# Patient Record
Sex: FEMALE | Race: WHITE | NOT HISPANIC OR LATINO | Employment: OTHER | URBAN - NONMETROPOLITAN AREA
[De-identification: names, ages, dates, MRNs, and addresses within clinical notes are randomized per-mention and may not be internally consistent; named-entity substitution may affect disease eponyms.]

---

## 2017-08-15 RX ORDER — CETIRIZINE HYDROCHLORIDE 10 MG/1
10 TABLET ORAL DAILY
COMMUNITY

## 2017-08-15 RX ORDER — TOPIRAMATE 25 MG/1
25 CAPSULE, COATED PELLETS ORAL 2 TIMES DAILY
COMMUNITY

## 2017-08-15 RX ORDER — BISOPROLOL FUMARATE 5 MG/1
5 TABLET, FILM COATED ORAL DAILY
COMMUNITY

## 2017-08-15 RX ORDER — QUETIAPINE FUMARATE 25 MG/1
25 TABLET, FILM COATED ORAL NIGHTLY
COMMUNITY
End: 2017-08-18

## 2017-08-15 RX ORDER — FLUTICASONE PROPIONATE 50 MCG
2 SPRAY, SUSPENSION (ML) NASAL DAILY
COMMUNITY

## 2017-08-15 RX ORDER — CLONAZEPAM 0.5 MG/1
0.5 TABLET ORAL NIGHTLY
COMMUNITY
End: 2018-05-25

## 2017-08-15 RX ORDER — FLUOXETINE HYDROCHLORIDE 20 MG/1
20 CAPSULE ORAL DAILY
COMMUNITY
End: 2017-08-18 | Stop reason: ALTCHOICE

## 2017-08-15 RX ORDER — ESTRADIOL 0.5 MG/1
1 TABLET ORAL DAILY
COMMUNITY

## 2017-08-15 RX ORDER — NAPROXEN SODIUM 220 MG
220 TABLET ORAL 2 TIMES DAILY PRN
COMMUNITY

## 2017-08-18 ENCOUNTER — OFFICE VISIT (OUTPATIENT)
Dept: NEUROLOGY | Facility: CLINIC | Age: 52
End: 2017-08-18

## 2017-08-18 VITALS
HEIGHT: 65 IN | DIASTOLIC BLOOD PRESSURE: 70 MMHG | BODY MASS INDEX: 26.49 KG/M2 | HEART RATE: 62 BPM | WEIGHT: 159 LBS | RESPIRATION RATE: 18 BRPM | SYSTOLIC BLOOD PRESSURE: 110 MMHG

## 2017-08-18 DIAGNOSIS — R26.89 IMBALANCE: ICD-10-CM

## 2017-08-18 DIAGNOSIS — M54.2 NECK PAIN: ICD-10-CM

## 2017-08-18 DIAGNOSIS — G47.33 OSA (OBSTRUCTIVE SLEEP APNEA): ICD-10-CM

## 2017-08-18 DIAGNOSIS — R40.4 EPISODE OF ALTERED CONSCIOUSNESS: Primary | ICD-10-CM

## 2017-08-18 DIAGNOSIS — G43.119 INTRACTABLE MIGRAINE WITH AURA WITHOUT STATUS MIGRAINOSUS: ICD-10-CM

## 2017-08-18 PROCEDURE — 99204 OFFICE O/P NEW MOD 45 MIN: CPT | Performed by: PSYCHIATRY & NEUROLOGY

## 2017-08-18 NOTE — PATIENT INSTRUCTIONS
No driving until released.  No work until released.  No climbing/ no use of sharp cutting tools.  Take showers not baths.  Not to swim by self or getting in hot tub by self.  No work over hot fires or water.  Patient is to go to emergency room if episode of altered consciousness occurs for evaluation

## 2017-08-18 NOTE — PROGRESS NOTES
Subjective   Rand Guzman, 1965, is a female who is being seen today for   Chief Complaint   Patient presents with   • Headache   • Loss of Consciousness       HISTORY OF PRESENT ILLNESS: Patient seen for episode of altered consciousness.  Patient has had episodes of altered consciousness for 17-18 years.  These were usually in stressful situations.  However apparently these become more frequent.  Her headaches are more frequent.  Usually the headaches are over the vertex area 2-3 times per week.  Usually preceded by neck discomfort going up to the vertex.  Usually associated with blurred vision in both eyes and nausea and sometimes vomiting.  Patient  frequently will pass out with these episodes and had an episode last night where her  came home and found her in bed unconscious.  Sometimes she will be out 10 minutes as long as 1 hour.  She had another one in [employer] last week.  Sometimes she will have several passing out episodes in a row.  Patient does not have any tonic-clonic activity term biting or incontinence.  Patient sometimes can hear but not respond during these episodes.  Her  thought with the episode last night her eyes were puffy and she was possibly cross eyed.  Patient felt a pop in her neck July 15 has had neck pain since that time.  Sometimes his pain goes into the left arm and left leg with numbness.  Patient cleans houses for a living.  Patient is had workup last November brain MRI of with him about that showed no significant abnormalities.  Also CT angiography of the brain showed no significant abnormalities last year.  She has not had an EEG.  Patient previous workup showed some sinus bradycardia but otherwise no major abnormalities/ patient is on Topamax 25 mg by mouth twice a day with the last several months which does seem to help the headache.  Patient was put on Seroquel but stopped that.  Patient has positive sleep study for JAQUAN but could not tolerate the CPAP.  Patient  has not had any other head trauma.  Patient thinks that her family doctor checked her blood sugar on her with one of these spells but not sure    REVIEW OF SYSTEMS:   GENERAL: Patient sometimes feels cold coming out of the episodes.  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  I do not have any of those records for review  ENDOCRINE:  No acute endocrine symptoms  PSYCHIATRIC: As above  HEMATOLOGY: No recent blood work for review  SKIN: No skin changes  Family history negative for seizures and migraine.  There has been history of stroke in family member  Social history: Patient denies smoking or drug or alcohol use    PHYSICAL EXAMINATION:    GENERAL: No acute distress.  No specific tenderness over the cranium or neck  CRANIUM: Normocephalic/atraumatic  HEENT: No acute fundic abnormalities.  Pupils equal round reactive to light.       EYES: Fields full to confrontation and EOMs intact without nystagmus       EARS:  Tympanic membranes normal hears tuning fork bilaterally       THROAT: No oropharynx abnormalities       NECK:  No bruits/no lymphadenopathy  CHEST: No acute cardiopulmonary abnormalities by auscultation  ABDOMEN: Nondistended  EXTREMITIES: Pulses symmetrical  NEURO: Patient alert and follows commands without difficulty  SPEECH:  Normal    CRANIAL NERVES:  Motor sensory about the face normal and symmetric    MOTOR STRENGTH:  Motor strength upper and lower extremities normal and symmetric  STATION AND GAIT:  Gait is normal/Romberg negative  CEREBELLAR:  Finger-nose and heel shin normal  SENSORY:  Pin and vibration normal upper and lower extremities  REFLEXES:  Reflexes present but decreased throughout upper and lower extremities without  Babinski's or clonus      ASSESSMENT AND PLAN:  Patient with episode of altered consciousness with history also of headaches/possible migraines.  Patient is to get MRI/MRA brain and noninvasive carotids and 2-D echo with bubble study.  Patient also has the chronic neck pain get MRI cervical.  Patient is to get an EEG and 24-hour EEG to follow.  I tried to get a stat EEG done this afternoon but EEG was not available at [facility] today.  Patient is to follow seizure precautions and no driving.  If further episode occurs is to go to emergency room immediately for evaluation.  Glucose tolerance test and further blood work to be done.      Rand was seen today for headache and loss of consciousness.    Diagnoses and all orders for this visit:    Episode of altered consciousness  -     CBC & Differential; Future  -     Comprehensive Metabolic Panel; Future  -     EEG; Future  -     Lipid Panel; Future  -     Magnesium; Future  -     Sedimentation Rate; Future  -     T4, Free; Future  -     Vitamin B12; Future  -     Folate; Future  -     MRI Angiogram Head Without Contrast; Future  -     Ambulatory EEG (Hospital Performed); Future  -     Adult Transthoracic Echo Complete; Future  -     MRI Brain Without Contrast; Future  -     Glucose Tolerance, 3 Hours; Future  -     Topiramate Level; Future    Intractable migraine with aura without status migrainosus  -     Topiramate Level; Future    Neck pain    JAQUAN (obstructive sleep apnea)  -     MRI Cervical Spine Without Contrast; Future    Imbalance  -     Overnight Sleep Oximetry Study; Future  -     US Carotid Bilateral; Future

## 2017-08-22 ENCOUNTER — TELEPHONE (OUTPATIENT)
Dept: NEUROLOGY | Facility: CLINIC | Age: 52
End: 2017-08-22

## 2017-08-22 NOTE — TELEPHONE ENCOUNTER
Mrs. Guzman called the office regarding the EEG's scheduled on Thursday 08/24/2017. She said that this date would not work for her (she will be out-of-town) and asked that we get them rescheduled. The next opportunity the EEG lab has to work her in is on, Thursday 08/31/2017. She took this appointment.

## 2017-08-24 ENCOUNTER — APPOINTMENT (OUTPATIENT)
Dept: NEUROLOGY | Facility: HOSPITAL | Age: 52
End: 2017-08-24
Attending: PSYCHIATRY & NEUROLOGY

## 2017-08-24 ENCOUNTER — HOSPITAL ENCOUNTER (OUTPATIENT)
Dept: NEUROLOGY | Facility: HOSPITAL | Age: 52
End: 2017-08-24
Attending: PSYCHIATRY & NEUROLOGY

## 2017-08-25 ENCOUNTER — HOSPITAL ENCOUNTER (OUTPATIENT)
Dept: NEUROLOGY | Facility: HOSPITAL | Age: 52
End: 2017-08-25

## 2017-08-25 ENCOUNTER — APPOINTMENT (OUTPATIENT)
Dept: NEUROLOGY | Facility: HOSPITAL | Age: 52
End: 2017-08-25

## 2017-08-25 DIAGNOSIS — R26.89 IMBALANCE: ICD-10-CM

## 2017-08-31 ENCOUNTER — HOSPITAL ENCOUNTER (OUTPATIENT)
Dept: NEUROLOGY | Facility: HOSPITAL | Age: 52
Discharge: HOME | End: 2017-08-31
Attending: PSYCHIATRY & NEUROLOGY

## 2017-08-31 ENCOUNTER — APPOINTMENT (OUTPATIENT)
Dept: NEUROLOGY | Facility: HOSPITAL | Age: 52
End: 2017-08-31
Attending: PSYCHIATRY & NEUROLOGY

## 2017-08-31 ENCOUNTER — HOSPITAL ENCOUNTER (OUTPATIENT)
Dept: NEUROLOGY | Facility: HOSPITAL | Age: 52
Discharge: HOME | End: 2017-08-31
Attending: PSYCHIATRY & NEUROLOGY | Admitting: PSYCHIATRY & NEUROLOGY

## 2017-08-31 DIAGNOSIS — R40.4 EPISODE OF ALTERED CONSCIOUSNESS: ICD-10-CM

## 2017-08-31 PROCEDURE — 95816 EEG AWAKE AND DROWSY: CPT | Performed by: PSYCHIATRY & NEUROLOGY

## 2017-08-31 PROCEDURE — 95816 EEG AWAKE AND DROWSY: CPT

## 2017-08-31 PROCEDURE — 95953 AMBULATORY EEG: CPT | Performed by: PSYCHIATRY & NEUROLOGY

## 2017-09-01 ENCOUNTER — TELEPHONE (OUTPATIENT)
Dept: NEUROLOGY | Facility: CLINIC | Age: 52
End: 2017-09-01

## 2017-09-01 ENCOUNTER — HOSPITAL ENCOUNTER (OUTPATIENT)
Dept: NEUROLOGY | Facility: HOSPITAL | Age: 52
End: 2017-09-01

## 2017-09-01 ENCOUNTER — HOSPITAL ENCOUNTER (OUTPATIENT)
Dept: NEUROLOGY | Facility: HOSPITAL | Age: 52
Discharge: HOME | End: 2017-09-01
Attending: PSYCHIATRY & NEUROLOGY | Admitting: PSYCHIATRY & NEUROLOGY

## 2017-09-01 PROCEDURE — 95950 HC EEG W/O VIDEO RECORDING EACH 24 HRS: CPT

## 2017-09-01 NOTE — TELEPHONE ENCOUNTER
I was able to speak with Mrs Guzman this morning. At this time she is not interested in the sleep study suggested by Dr Nails. She states she has tried to complete one in the past but did not like it. She states she will think on it and call the office back if she decides to do the sleep study.

## 2017-09-11 ENCOUNTER — TELEPHONE (OUTPATIENT)
Dept: NEUROLOGY | Facility: CLINIC | Age: 52
End: 2017-09-11

## 2017-09-11 NOTE — TELEPHONE ENCOUNTER
Rand called to let Krupa know that she did get her letter. She would like to hold off on the referral to Dr Chapman until she can speak with her . She states she will call Krupa back tomorrow to let her know about the referral to Dr Chapman and the Sleep Study.

## 2017-09-13 DIAGNOSIS — G43.119 INTRACTABLE MIGRAINE WITH AURA WITHOUT STATUS MIGRAINOSUS: ICD-10-CM

## 2017-09-13 DIAGNOSIS — R40.4 EPISODE OF ALTERED CONSCIOUSNESS: ICD-10-CM

## 2017-09-20 ENCOUNTER — TELEPHONE (OUTPATIENT)
Dept: NEUROLOGY | Facility: CLINIC | Age: 52
End: 2017-09-20

## 2017-09-22 DIAGNOSIS — R26.89 IMBALANCE: ICD-10-CM

## 2017-09-22 DIAGNOSIS — G47.33 OSA (OBSTRUCTIVE SLEEP APNEA): ICD-10-CM

## 2017-09-22 DIAGNOSIS — R40.4 EPISODE OF ALTERED CONSCIOUSNESS: ICD-10-CM

## 2017-09-29 ENCOUNTER — OFFICE VISIT (OUTPATIENT)
Dept: NEUROLOGY | Facility: CLINIC | Age: 52
End: 2017-09-29

## 2017-09-29 ENCOUNTER — TELEPHONE (OUTPATIENT)
Dept: NEUROLOGY | Facility: CLINIC | Age: 52
End: 2017-09-29

## 2017-09-29 ENCOUNTER — HOSPITAL ENCOUNTER (OUTPATIENT)
Dept: CT IMAGING | Facility: HOSPITAL | Age: 52
Discharge: HOME | End: 2017-09-29
Attending: PSYCHIATRY & NEUROLOGY | Admitting: PSYCHIATRY & NEUROLOGY

## 2017-09-29 ENCOUNTER — DOCUMENTATION (OUTPATIENT)
Dept: NEUROLOGY | Facility: CLINIC | Age: 52
End: 2017-09-29

## 2017-09-29 ENCOUNTER — HOSPITAL ENCOUNTER (OUTPATIENT)
Dept: CT IMAGING | Facility: HOSPITAL | Age: 52
Discharge: HOME | End: 2017-09-29
Attending: PSYCHIATRY & NEUROLOGY

## 2017-09-29 VITALS
DIASTOLIC BLOOD PRESSURE: 72 MMHG | WEIGHT: 159 LBS | SYSTOLIC BLOOD PRESSURE: 118 MMHG | RESPIRATION RATE: 18 BRPM | BODY MASS INDEX: 26.49 KG/M2 | HEART RATE: 80 BPM | HEIGHT: 65 IN

## 2017-09-29 DIAGNOSIS — R40.4 EPISODE OF ALTERED CONSCIOUSNESS: Primary | ICD-10-CM

## 2017-09-29 DIAGNOSIS — I65.21 STENOSIS OF RIGHT CAROTID ARTERY: ICD-10-CM

## 2017-09-29 LAB — CREAT BLDA-MCNC: 1.1 MG/DL (ref 0.6–1.3)

## 2017-09-29 PROCEDURE — 82565 ASSAY OF CREATININE: CPT

## 2017-09-29 PROCEDURE — 70498 CT ANGIOGRAPHY NECK: CPT

## 2017-09-29 PROCEDURE — 70496 CT ANGIOGRAPHY HEAD: CPT

## 2017-09-29 PROCEDURE — 0 IOPAMIDOL PER 1 ML: Performed by: PSYCHIATRY & NEUROLOGY

## 2017-09-29 PROCEDURE — 99215 OFFICE O/P EST HI 40 MIN: CPT | Performed by: PSYCHIATRY & NEUROLOGY

## 2017-09-29 RX ADMIN — IOPAMIDOL 150 ML: 755 INJECTION, SOLUTION INTRAVENOUS at 17:30

## 2017-09-29 NOTE — TELEPHONE ENCOUNTER
I contacted patient's  regarding the results of the CT angiogram head and neck.  There was some mild subclavian stenosis 25% on the left but no significant carotid stenosis.  Also intracranially the vessels are somewhat small in the basilar distribution but this is probably congenital.  I discussed this at length with the radiologist and then with the patient.  Patient had an episode after the CT angiogram of some altered consciousness for a few minutes.  Apparently she was stable at the time I discussed this with the patient's  and he was at [facility] with his wife.  Patient is to be discussed with patient's PCP next week and patient has not had a tilt table test.  Patient might benefit from further cardiology workup.

## 2017-09-29 NOTE — PROGRESS NOTES
Subjective   Rand Guzman, 1965, is a female who is being seen today for   Chief Complaint   Patient presents with   • Headache   • Loss of Consciousness       HISTORY OF PRESENT ILLNESS: Very extended follow-up.  Patient had workup including MRI brain which showed no significant acute abnormalities.  MRA brain showed development absent posterior communicating arteries.  Patient had EEG and 24-hour EEG which showed no abnormalities with symptoms..  Echocardiogram showed no significant abnormalities bubble study negative.  C-spine MRI shows cervical straightening which may be positional per second and muscle spasm and uncovertebral joint hypertrophy at C4/C5 and C5/C6 is bilateral neural foraminal narrowing.  Glucose tolerance test no abnormalities and rest of blood work basically noncontributory except for some elevation of triglycerides and cholesterol which were moderate.  Free T4 was borderline low.  Overnight continuous oximetry does not qualify for oxygen and patient has not been able tolerate Pap device and past.  Patient did have 18 events per hour.  Patient's noninvasive carotids question of possibility of 60-80% stenosis in the right internal carotid but there was some discrepancy between elevated ratio on the right without corresponding elevated velocity.  CTA or MRA neck was recommended.  Patient is had previous CTA head last year / [facility] that was showing no abnormalities.  Patient according to the  shows me an episode this past Sunday where her episode of unresponsiveness a Muslim causes her eyes to be puffy and she was unresponsive for several minutes.  These episodes increase with exertion.  Patient has intermittent episodes of disorientation and confusion and apparently has had many as 11 episodes since last seen.  Patient continues the headaches as previously discussed essentially unchanged.  Patient says that the bottom of her feet have been somewhat numb  recently    REVIEW OF SYSTEMS:   GENERAL: No acute fever/chills  PULMONARY: As above  CVS:  As above  GASTROINTESTINAL: As previously noted    GENITOURINARY: No acute  distress  GYN: Post hysterectomy  MUSCULOSKELETAL: As above  HEENT:  Patient with chronically blurred vision  ENDOCRINE:  No acute endocrine symptoms  PSYCHIATRIC: No acute psychiatric symptoms  HEMATOLOGY: No anemia  SKIN: No acute skin changes other than as noted above  Family history of stroke  Social history: Patient with no use of smoking or drug or alcohol use    PHYSICAL EXAMINATION:    GENERAL: No acute distress  CRANIUM: Normocephalic/atraumatic  HEENT: No acute fundic abnormalities.  Pupils equal round reactive to light.       EYES: EOMs intact without nystagmus and fields full to confrontation       EARS:  Tympanic membranes normal hears tuning fork bilaterally       THROAT: No oropharynx abnormalities       NECK:  No bruits/no lymphadenopathy  CHEST: No acute cardiopulmonary abnormalities by auscultation  ABDOMEN: Nondistended  EXTREMITIES: Pulses symmetrical  NEURO: Patient alert and follows commands without difficulty  SPEECH:  Normal    CRANIAL NERVES:  Motor sensory about the face normal and symmetric    MOTOR STRENGTH:  Motor strength upper and lower extremity is normal  STATION AND GAIT:  Gait is normal/Romberg negative  CEREBELLAR:  Finger-nose and heel shin normal  SENSORY:  Pin and vibration normal upper and lower extremities except for slight decrease in vibration in the toes bilaterally.  REFLEXES:  Reflexes present and symmetric upper and lower extremities without Babinski's      ASSESSMENT AND PLAN:  Patient with episode of altered consciousness and possible carotid stenosis on the right.  Patient with the continuing episodes need CT angiography head and neck as possible.  If CT angiography neck and head  noncontributory patient may have some type of migraine variant but there could be several other contributing factors.  I  went over risks benefits of proceeding with CT angiography today patient and  nondistended and wishes to proceed.  Patient had had previous CT angiography of the head without significant side effects.  I spent 40 minutes with this patient with 30 minutes in counseling      Rand was seen today for headache and loss of consciousness.    Diagnoses and all orders for this visit:    Episode of altered consciousness    Stenosis of right carotid artery  -     Ambulatory Referral to Vascular Surgery  -     CT Angiogram Neck With & Without Contrast; Future  -     CT Angiogram Head With & Without Contrast; Future

## 2017-10-03 DIAGNOSIS — G47.33 OSA (OBSTRUCTIVE SLEEP APNEA): Primary | ICD-10-CM

## 2017-10-03 DIAGNOSIS — R40.4 EPISODE OF ALTERED CONSCIOUSNESS: ICD-10-CM

## 2017-10-04 ENCOUNTER — TELEPHONE (OUTPATIENT)
Dept: NEUROLOGY | Facility: CLINIC | Age: 52
End: 2017-10-04

## 2017-10-04 NOTE — TELEPHONE ENCOUNTER
PT TOLD SCHEDULING SHE WANTED TO GET HER SLEEP TESTING DONE BEFORE THE END OF THE YEAR SHE ASKED FOR THE ORDERS TO BE MAILED TO HER AND SHE TAKE THEM TO ONE OF THE HOSPITALS NEAR HER. SHE ALSO WANTED TO KNOW IF YOU ARE GOING TO INCREASE OR CHANGE ANY OF HER MEDICATION SHE USES [facility] PHONE NUMBER [phone]. SHE STATES SHE STILL HAS A HEADACHE AND HAD ANOTHER SPELL Saturday WHERE SHE FELL OUT IN THE YARD AND IT WAS A WHILE BEFORE ANYONE FOUND HER THAT DAY.

## 2017-10-05 ENCOUNTER — TELEPHONE (OUTPATIENT)
Dept: NEUROLOGY | Facility: CLINIC | Age: 52
End: 2017-10-05

## 2017-10-05 NOTE — TELEPHONE ENCOUNTER
Rand called to state that her sleep study will not be for several months. She would like to know if she may go somewhere else. I did let her know that Dr Nails prefers his studies to be done here so he can read them but I will call the sleep lab.    I did call the sleep lab and spoke with Tamia. She did check her schedule and we were able to move Rand's Polysom up to

## 2017-10-06 NOTE — TELEPHONE ENCOUNTER
I did call to let Mrs Guzman know that I am still working on a sooner appointment for her but that I do not have one for her yet. She did voice understanding.

## 2017-10-09 ENCOUNTER — TELEPHONE (OUTPATIENT)
Dept: NEUROLOGY | Facility: CLINIC | Age: 52
End: 2017-10-09

## 2017-10-09 NOTE — TELEPHONE ENCOUNTER
Rand called to request we fax a copy of the order for her sleep study to [facility] in [place].     [phone]

## 2017-11-28 ENCOUNTER — OFFICE VISIT (OUTPATIENT)
Dept: NEUROLOGY | Facility: CLINIC | Age: 52
End: 2017-11-28

## 2017-11-28 VITALS
BODY MASS INDEX: 27.16 KG/M2 | HEIGHT: 65 IN | DIASTOLIC BLOOD PRESSURE: 80 MMHG | RESPIRATION RATE: 18 BRPM | WEIGHT: 163 LBS | SYSTOLIC BLOOD PRESSURE: 114 MMHG | HEART RATE: 72 BPM

## 2017-11-28 DIAGNOSIS — G47.33 OSA (OBSTRUCTIVE SLEEP APNEA): Primary | ICD-10-CM

## 2017-11-28 PROCEDURE — 99214 OFFICE O/P EST MOD 30 MIN: CPT | Performed by: PSYCHIATRY & NEUROLOGY

## 2017-11-28 NOTE — PATIENT INSTRUCTIONS
Patient to have no driving and safety precautions as previously discussed.  No work over hot fires or water no use of sharp cutting tools no climbing.  Patient takes showers not baths and not to get a hot tub or swim by self.

## 2017-11-28 NOTE — PROGRESS NOTES
Subjective   Rand Guzman, 1965, is a female who is being seen today for   Chief Complaint   Patient presents with   • Altered Mental Status       HISTORY OF PRESENT ILLNESS: Extended follow-up.  Patient had her polysomnogram which show AHI of 20 and PLM's.  She spent 50 minutes below 90% oxygen saturation with the lowest recorded at 85%.  Patient had fragmented sleep.  CPAP titration was recommended.  Patient is had multiple events of possible sleep attacks its last seen.  Patient had an event today after coming and when I came in the room she was asleep for perhaps a minute before I cannot arouse her.  Patient had no change in vital signs.  Patient's MSLT was on hold until the titration.  Patient does not have true cataplectic symptoms.  Patient is not having hypnagogic hallucinations.  Patient may have sleep paralysis.  Patient says that when she gets under stress that things just close in.  Patient after I arouse the patient today had no postictal findings.  Patient still complaining of the headaches.    REVIEW OF SYSTEMS:   GENERAL: As above  PULMONARY: As above  CVS:  No chest pain or palpitation  GASTROINTESTINAL: Patient still has some nausea at times  GENITOURINARY: No acute  distress  GYN: No acute GYN distress  MUSCULOSKELETAL: No acute musculoskeletal symptoms  HEENT:  No acute vision or hearing change  ENDOCRINE:  No acute endocrine symptoms  PSYCHIATRIC: No acute psychiatric symptoms other than as noted above  HEMATOLOGY: No anemia  SKIN: No skin changes  Family history reviewed and otherwise noncontributory.  There is no family history of narcolepsy  Social history: Patient denies smoking or drug or alcohol use    PHYSICAL EXAMINATION:    GENERAL: As above  CRANIUM: Normocephalic and atraumatic and no specific tenderness over the cranium  HEENT: No acute fundic abnormalities.  Pupils equal round reactive to light.       EYES: EOMs intact without nystagmus and fields full to confrontation        EARS:  Tympanic membranes normal hears tuning fork bilaterally       THROAT: No oropharynx abnormalities       NECK:  No bruits/no lymphadenopathy  CHEST: No acute cardiopulmonary abnormalities by auscultation  ABDOMEN: Nondistended  EXTREMITIES: Pulses symmetrical  NEURO: Patient alert and follows commands without difficulty  SPEECH:  Normal    CRANIAL NERVES:  Motor sensory about the face normal and symmetric    MOTOR STRENGTH:  Motor strength upper and lower extremities normal  STATION AND GAIT:  Gait normal/Romberg negative  CEREBELLAR:  Finger-nose and heel shin normal  SENSORY:  Normal pin and vibration upper and lower extremities except for slight decrease in  vibration in the toes  REFLEXES:  Reflexes present and symmetric upper and lower extremity without Babinski's  OTHER:  Blood pressure today 110/80 sitting 114/80 standing left arm.  Pulse is 72 and regular    ASSESSMENT AND PLAN:  Patient with JAQUAN.  Patient is to get titration.  Patient may need MSLT at some point.  I spent 25 minutes with this patient with 15 minutes counseling.  Safety precautions are to patient not to be driving continue as previously discussed.      Rand was seen today for altered mental status.    Diagnoses and all orders for this visit:    JAQUAN (obstructive sleep apnea)  -     Polysomnography 4 or More Parameters With CPAP; Future

## 2017-12-07 ENCOUNTER — TELEPHONE (OUTPATIENT)
Dept: NEUROLOGY | Facility: CLINIC | Age: 52
End: 2017-12-07

## 2017-12-07 NOTE — TELEPHONE ENCOUNTER
Patient called to see if I would mail her a copy of her sleep study. I did tell her that I will put that in the mail today. She did voice understanding.

## 2017-12-21 ENCOUNTER — TELEPHONE (OUTPATIENT)
Dept: NEUROLOGY | Facility: CLINIC | Age: 52
End: 2017-12-21

## 2017-12-21 NOTE — TELEPHONE ENCOUNTER
Pt  has called to make Dr. Nails aware that they feel as if her sleep conditions are worsening. Next follow up is 1/29/18

## 2017-12-29 ENCOUNTER — TELEPHONE (OUTPATIENT)
Dept: NEUROLOGY | Facility: CLINIC | Age: 52
End: 2017-12-29

## 2018-01-03 ENCOUNTER — TELEPHONE (OUTPATIENT)
Dept: NEUROLOGY | Facility: CLINIC | Age: 53
End: 2018-01-03

## 2018-01-03 NOTE — TELEPHONE ENCOUNTER
----- Message from Erwin Nails MD sent at 12/29/2017  4:20 PM CST -----  Check with patient's sleep location and see if they will will do  MSLT the day after the titration  ----- Message -----     From: Russell Pelaez LPN     Sent: 12/29/2017  11:03 AM       To: Erwin Nails MD    I did send you a phone note. She is scheduled in [place] for her Titration Study 1/25/18 and will see you 1/29/18.    ----- Message -----     From: Erwin Nails MD     Sent: 12/29/2017  10:55 AM       To: Russell Pelaez LPN    Contact patient.  I had note that patient's sleep problems were worsening and I need to know where they are as far as getting the titration done.

## 2018-01-03 NOTE — TELEPHONE ENCOUNTER
----- Message from Erwin Nails MD sent at 12/29/2017 11:05 AM CST -----  See of we can contact the sleep center in [place] where she is going to have her tests and go ahead and do the titration and the MSLT on the same evening and day after because of her symptoms.  See if they can work  in earlier

## 2018-01-29 ENCOUNTER — LAB (OUTPATIENT)
Dept: LAB | Facility: HOSPITAL | Age: 53
End: 2018-01-29
Attending: PSYCHIATRY & NEUROLOGY

## 2018-01-29 ENCOUNTER — OFFICE VISIT (OUTPATIENT)
Dept: NEUROLOGY | Facility: CLINIC | Age: 53
End: 2018-01-29

## 2018-01-29 DIAGNOSIS — R40.4 EPISODE OF ALTERED CONSCIOUSNESS: Primary | ICD-10-CM

## 2018-01-29 DIAGNOSIS — G47.33 OSA (OBSTRUCTIVE SLEEP APNEA): ICD-10-CM

## 2018-01-29 DIAGNOSIS — R40.4 EPISODE OF ALTERED CONSCIOUSNESS: ICD-10-CM

## 2018-01-29 LAB
ALBUMIN SERPL-MCNC: 4.2 G/DL (ref 3.5–5)
ALBUMIN/GLOB SERPL: 1.6 G/DL (ref 1.1–2.5)
ALP SERPL-CCNC: 55 U/L (ref 24–120)
ALT SERPL W P-5'-P-CCNC: 33 U/L (ref 0–54)
ANION GAP SERPL CALCULATED.3IONS-SCNC: 11 MMOL/L (ref 4–13)
AST SERPL-CCNC: 22 U/L (ref 7–45)
BASOPHILS # BLD AUTO: 0.04 10*3/MM3 (ref 0–0.2)
BASOPHILS NFR BLD AUTO: 0.6 % (ref 0–2)
BILIRUB SERPL-MCNC: 0.3 MG/DL (ref 0.1–1)
BUN BLD-MCNC: 13 MG/DL (ref 5–21)
BUN/CREAT SERPL: 15.5 (ref 7–25)
CALCIUM SPEC-SCNC: 8.8 MG/DL (ref 8.4–10.4)
CHLORIDE SERPL-SCNC: 108 MMOL/L (ref 98–110)
CK SERPL-CCNC: 97 U/L (ref 0–203)
CO2 SERPL-SCNC: 25 MMOL/L (ref 24–31)
CREAT BLD-MCNC: 0.84 MG/DL (ref 0.5–1.4)
D-LACTATE SERPL-SCNC: 1.8 MMOL/L (ref 0.5–2)
DEPRECATED RDW RBC AUTO: 42.8 FL (ref 40–54)
EOSINOPHIL # BLD AUTO: 0.12 10*3/MM3 (ref 0–0.7)
EOSINOPHIL NFR BLD AUTO: 1.7 % (ref 0–4)
ERYTHROCYTE [DISTWIDTH] IN BLOOD BY AUTOMATED COUNT: 12.6 % (ref 12–15)
GFR SERPL CREATININE-BSD FRML MDRD: 71 ML/MIN/1.73
GLOBULIN UR ELPH-MCNC: 2.7 GM/DL
GLUCOSE BLD-MCNC: 103 MG/DL (ref 70–100)
HCT VFR BLD AUTO: 39.7 % (ref 37–47)
HGB BLD-MCNC: 13.3 G/DL (ref 12–16)
IMM GRANULOCYTES # BLD: 0.05 10*3/MM3 (ref 0–0.03)
IMM GRANULOCYTES NFR BLD: 0.7 % (ref 0–5)
LYMPHOCYTES # BLD AUTO: 2.23 10*3/MM3 (ref 0.72–4.86)
LYMPHOCYTES NFR BLD AUTO: 30.8 % (ref 15–45)
MAGNESIUM SERPL-MCNC: 2.1 MG/DL (ref 1.4–2.2)
MCH RBC QN AUTO: 31.1 PG (ref 28–32)
MCHC RBC AUTO-ENTMCNC: 33.5 G/DL (ref 33–36)
MCV RBC AUTO: 92.8 FL (ref 82–98)
MONOCYTES # BLD AUTO: 0.47 10*3/MM3 (ref 0.19–1.3)
MONOCYTES NFR BLD AUTO: 6.5 % (ref 4–12)
NEUTROPHILS # BLD AUTO: 4.32 10*3/MM3 (ref 1.87–8.4)
NEUTROPHILS NFR BLD AUTO: 59.7 % (ref 39–78)
NRBC BLD MANUAL-RTO: 0 /100 WBC (ref 0–0)
PLATELET # BLD AUTO: 320 10*3/MM3 (ref 130–400)
PMV BLD AUTO: 9.9 FL (ref 6–12)
POTASSIUM BLD-SCNC: 4.6 MMOL/L (ref 3.5–5.3)
PROT SERPL-MCNC: 6.9 G/DL (ref 6.3–8.7)
RBC # BLD AUTO: 4.28 10*6/MM3 (ref 4.2–5.4)
SODIUM BLD-SCNC: 144 MMOL/L (ref 135–145)
WBC NRBC COR # BLD: 7.23 10*3/MM3 (ref 4.8–10.8)

## 2018-01-29 PROCEDURE — 80053 COMPREHEN METABOLIC PANEL: CPT | Performed by: PSYCHIATRY & NEUROLOGY

## 2018-01-29 PROCEDURE — 99214 OFFICE O/P EST MOD 30 MIN: CPT | Performed by: PSYCHIATRY & NEUROLOGY

## 2018-01-29 PROCEDURE — 84146 ASSAY OF PROLACTIN: CPT | Performed by: PSYCHIATRY & NEUROLOGY

## 2018-01-29 PROCEDURE — 85025 COMPLETE CBC W/AUTO DIFF WBC: CPT | Performed by: PSYCHIATRY & NEUROLOGY

## 2018-01-29 PROCEDURE — 83605 ASSAY OF LACTIC ACID: CPT | Performed by: PSYCHIATRY & NEUROLOGY

## 2018-01-29 PROCEDURE — 36415 COLL VENOUS BLD VENIPUNCTURE: CPT | Performed by: PSYCHIATRY & NEUROLOGY

## 2018-01-29 PROCEDURE — 83735 ASSAY OF MAGNESIUM: CPT | Performed by: PSYCHIATRY & NEUROLOGY

## 2018-01-29 PROCEDURE — 82550 ASSAY OF CK (CPK): CPT | Performed by: PSYCHIATRY & NEUROLOGY

## 2018-01-29 NOTE — PATIENT INSTRUCTIONS
No driving.  No work at heights or with sharp cutting tools or over hot fires or water.  No climbing.  Take showers not baths.  No hot tub by self or swimming by self

## 2018-01-29 NOTE — PROGRESS NOTES
"Subjective   Rand Guzman, 1965, is a female who is being seen today for No chief complaint on file.      HISTORY OF PRESENT ILLNESS: Extended follow-up.  Patient seen for episode of altered consciousness.  Patient also was diagnosed with JAQUAN and the titration was done but is not reported as of yet from [facility].  Patient fell out and are waiting room while waiting on the appointment today vital signs were stable and patient was lying there eyes closed /questionably nonresponsive and when it was mentioned that she would need to go to the emergency room to be evaluated quickly came around with eyes fluttering and alert within 1-2 minutes.  By the time I got to the waiting room, 2 minutes after symptoms started, patient back to normal.  I brought her immediately into the office to examine her.  Patient apparently had done this( just prior to coming to the office) at [facility] where she was standing at the  the buggy staring and her  and gone to the bathroom.  She is doing this sometimes several times a day and very few days are without episodes.  She has not gotten her MSLT done as of yet.  Patient was immediately back to normal mental status-wise and I examined her in the office.  Vital signs were 130/90 seated 122/70 standing pulse 74 and regular.  By the time she was discharged her blood pressure was 122/70 standing 122/70 sitting with pulse again regular and unchanged.  Patient usually feels the episodes coming on.  There was no reciprocating event today however when she gets stressed/fatigued she has more episodes.  Patient is going to be referred to Dr. Chapman for more extended monitoring but may need University evaluation.  Patient has not hit her head except \"lightly\" one time in the remote past.  Patient not hurt herself with the events.  Patient is sent down for blood work today.  REVIEW OF SYSTEMS:   GENERAL: As above  PULMONARY: As above  CVS:  No acute chest pain or " palpitation  GASTROINTESTINAL: No acute GI distress  GENITOURINARY: No acute  distress  GYN: No acute GYN distress  MUSCULOSKELETAL: No acute musculoskeletal symptoms  HEENT:  No acute hearing or vision change  ENDOCRINE:  No acute endocrine symptoms  PSYCHIATRIC: No acute psychiatric symptoms  HEMATOLOGY: No acute anemia  SKIN: No skin changes  Family history reviewed and otherwise noncontributory  Social history: Patient denies alcohol use or drug use or smoking    PHYSICAL EXAMINATION:    GENERAL: As above  CRANIUM: Normocephalic/atraumatic  HEENT: No acute fundic abnormalities.  Pupils equal round reactive to light.       EYES: EOMs intact without nystagmus and fields full to confrontation       EARS:  Tympanic membranes normal hears tuning fork bilaterally       THROAT: No oropharynx abnormalities       NECK:  No bruits/no lymphadenopathy  CHEST: No acute cardiopulmonary abnormalities by auscultation  ABDOMEN: Nondistended  EXTREMITIES: Pulses symmetrical  NEURO: Patient alert and follows commands without difficulty  SPEECH:  Normal    CRANIAL NERVES:  Motor sensory about the face normal and symmetric    MOTOR STRENGTH:  Motor strength upper and lower extremities normal  STATION AND GAIT:  Gait is normal/Romberg negative  CEREBELLAR:  Finger-nose and heel shin normal  SENSORY:  Pin and vibration upper and lower extremities normal  REFLEXES:  Reflexes present and symmetric upper and lower extremity Babinski's      ASSESSMENT AND PLAN:  Patient with episodes of altered consciousness.  Workup in progress.  Patient not to be driving and safety precautions again reviewed.  I spoke with Dr. Chapman who will see the patient this coming Wednesday at 8 AM.  I spent 25 minutes with this patient with 15 minutes counseling      Diagnoses and all orders for this visit:    Episode of altered consciousness  -     CBC & Differential  -     Comprehensive Metabolic Panel  -     Magnesium  -     CK; Future  -     Lactic Acid,  Plasma; Future  -     Prolactin; Future  -     Ambulatory Referral to Neurology    JAQUAN (obstructive sleep apnea)

## 2018-01-30 ENCOUNTER — DOCUMENTATION (OUTPATIENT)
Dept: NEUROLOGY | Facility: CLINIC | Age: 53
End: 2018-01-30

## 2018-01-30 ENCOUNTER — TELEPHONE (OUTPATIENT)
Dept: NEUROLOGY | Facility: CLINIC | Age: 53
End: 2018-01-30

## 2018-01-30 NOTE — PROGRESS NOTES
I have contacted Marina at 's office to let her know  has accepted patient.  He will see her on Wednesday, Jan. 31st at 8am.  I have sent over her demographics and records.

## 2018-01-31 ENCOUNTER — OFFICE VISIT (OUTPATIENT)
Dept: NEUROSURGERY | Age: 53
End: 2018-01-31
Payer: COMMERCIAL

## 2018-01-31 VITALS
OXYGEN SATURATION: 97 % | SYSTOLIC BLOOD PRESSURE: 157 MMHG | HEART RATE: 62 BPM | DIASTOLIC BLOOD PRESSURE: 86 MMHG | WEIGHT: 200 LBS

## 2018-01-31 DIAGNOSIS — R40.4 ALTERED LEVEL OF CONSCIOUSNESS: Primary | ICD-10-CM

## 2018-01-31 DIAGNOSIS — R51.9 PERSISTENT HEADACHES: ICD-10-CM

## 2018-01-31 DIAGNOSIS — F43.9 STRESS: ICD-10-CM

## 2018-01-31 DIAGNOSIS — F41.9 ANXIETY: ICD-10-CM

## 2018-01-31 LAB — PROLACTIN SERPL-MCNC: 8.9 NG/ML (ref 4.8–23.3)

## 2018-01-31 PROCEDURE — 99204 OFFICE O/P NEW MOD 45 MIN: CPT | Performed by: NURSE PRACTITIONER

## 2018-01-31 RX ORDER — TOPIRAMATE 25 MG/1
1 TABLET ORAL 2 TIMES DAILY
Refills: 3 | COMMUNITY
Start: 2017-11-17

## 2018-01-31 RX ORDER — CETIRIZINE HYDROCHLORIDE 10 MG/1
10 TABLET ORAL DAILY
COMMUNITY

## 2018-01-31 RX ORDER — ESTRADIOL 0.5 MG/1
TABLET ORAL
Refills: 2 | COMMUNITY
Start: 2017-11-22

## 2018-01-31 RX ORDER — COVID-19 ANTIGEN TEST
2 KIT MISCELLANEOUS 2 TIMES DAILY PRN
COMMUNITY

## 2018-01-31 RX ORDER — BISOPROLOL FUMARATE 5 MG/1
1 TABLET ORAL DAILY
COMMUNITY
Start: 2017-12-19

## 2018-01-31 RX ORDER — FLUTICASONE PROPIONATE 50 MCG
1 SPRAY, SUSPENSION (ML) NASAL DAILY
COMMUNITY

## 2018-01-31 RX ORDER — CLONAZEPAM 0.5 MG/1
0.5 TABLET ORAL 2 TIMES DAILY PRN
COMMUNITY

## 2018-01-31 NOTE — PROGRESS NOTES
REVIEW OF SYSTEMS    Constitutional: []Fever []Sweats []Chills [] Recent Injury []Fatigue  [x] Denies all unless marked  HEENT:[x]Headache  [] Head Injury  [] Sore Throat  [] Ear Pain  [x]Dizziness [] Hearing Loss []Trouble Swallowing []Voice Change  [] Eye Pain  [] Eye Injection []Visual Disturbance  [] Ptosis  [] Tinnitus [x] Denies all unless marked  Spine:  [] Neck pain  [] Back pain  [] Sciaticia  [x] Denies all unless marked  Cardiovascular:[]Chest Pain []Palpitations [] Heart Disease  [x] Denies all unless marked  Pulmonary: [x]Shortness of Breath []Cough  []Wheezing  [x] Denies all unless marked  Gastrointestinal:  []Abdominal Pain  []Blood in Stool  []Diarrhea []Constipation []Nausea  []Vomiting  [x] Denies all unless marked  Genitourinary:  [] Dysuria [] Enuresis [] Incontinence [] Frequency/Urgency  [] Hematuria  [x] Denies all unless marked  Musculoskeletal: [] Joint Pain [] Myalgias [] Joint Swelling [] Neck Stiffness  [x] Denies all unless marked  Skin:[] Rash [] Pallor [] Color Change [] Wound  [x] Denies all unless marked  Neurological:[x] Visual Disturbance [x] Double Vision [x] Slurred Speech [] Trouble swallowing  [x] Vertigo [] Tingling [] Numbness [x] Weakness [x] Loss of Balance [] Loss of Consciousness [x] Memory Loss [] Tremor [] Seizure [] Syncope  [] Ataxia  [x] Denies all unless marked  Psychiatric/Behavioral:[x] Depression [x] Anxiety [] Confusion [] Agitation [] Behavior Problems  [] Hallucinations  [] Suicidal idiation  [x] Denies all unless marked  Sleep: [x]  Insomnia [] Sleep Disturbance [] Snoring [] Restless Legs [] Daytime Sleeping [] Sleep Apnea  [x] Denies all unless marked  Hematological:[] Adenopathy [] Bruises/Bleeds Easily  [x] Denies all unless marked  Endocrine: [] Cold Intolerance [] Heat Intolerance [] Polydipsia [] Polyphagia [] Polyuria  [x]Denies all unless marked  Allergic/Immunologic:[] Environmental Allergies [] Food Allergies [] Immunocompromised state  [x] Denies
appear appropriate  [x]Recent and remote memory appears unremarkable  [x]Speech normal without dysarthria or aphasia, comprehension and repetition intact. COMMENTS:    Cranial Nerves [x]No VF deficit to confrontation,  no papilledema on fundoscopic exam.  [x]PERRLA, EOMI, no nystagmus, conjugate eye movements, no ptosis  [x]Face symmetric  [x]Facial sensation intact  [x]Tongue midline no atrophy or fasciculations present  [x]Palate midline, hearing to finger rub normal bilaterally  [x]Shoulder shrug and SCM testing normal bilaterally  COMMENTS:   Motor   [x]5/5 strength x 4 extremities  [x]Normal bulk and tone  [x]No tremor present  [x]No rigidity or bradykinesia noted  COMMENTS:   Sensory  [x]Sensation intact to light touch, pin prick, vibration, and proprioception BLE  [x]Sensation intact to light touch, pin prick, vibration, and proprioception BUE  COMMENTS:   Coordination [x]FTN normal bilaterally   [x]HTS normal bilaterally  [x]SALVADOR normal bilaterally. COMMENTS:   Reflexes  [x]Symmetric and non-pathological  [x]Toes down going bilaterally  [x]No clonus present  COMMENTS:    Gait                  [x]Normal steady gait    []Ataxic    []Spastic     []Magnetic     []Shuffling  COMMENTS:        LABS RECORD AND IMAGING REVIEW (As below and per HPI)  CBC, CMP, lactate, CK, magnesium reviewed- unremarkable    US carotid duplex- mild mixed plaque in the carotid bifurcations; elevated ratio on the right but without corresponding elevated velocity. CTA/MRA recommended for discrepency. MRA- unremarkable    CTA- no focal high-grade stenosis of the extracranial carotid or vertebral arteries. MRI brain- unremarkable    MRI c-spine- No significant degenerative disc disease. No acute or chronic abnormalities of the cervical cord. Uncovertebral joint hypertrophy at the C4-C5 and C5-C6 levels and with bilateral neural foraminal narrowing at these levels. EEG- normal awake EEG.   Episodes as noted do not have any EEG

## 2018-02-06 DIAGNOSIS — R40.4 EPISODE OF ALTERED CONSCIOUSNESS: ICD-10-CM

## 2018-02-06 DIAGNOSIS — G47.33 OSA (OBSTRUCTIVE SLEEP APNEA): ICD-10-CM

## 2018-02-08 ENCOUNTER — TELEPHONE (OUTPATIENT)
Dept: NEUROSURGERY | Age: 53
End: 2018-02-08

## 2018-03-15 ENCOUNTER — TELEPHONE (OUTPATIENT)
Dept: NEUROLOGY | Facility: CLINIC | Age: 53
End: 2018-03-15

## 2018-03-15 NOTE — TELEPHONE ENCOUNTER
[facility]id[facility] Avery from [facility] called to let Dr Nails know that during her MSLT they were not able to wake Rand Guzman and had to call Rapid Response Team before they were able to rouse her from her second nap. They did eventually get her to respond and they are completing the MSLT but wanted Dr Nails to be aware of this episode.

## 2018-03-16 ENCOUNTER — TELEPHONE (OUTPATIENT)
Dept: NEUROLOGY | Facility: CLINIC | Age: 53
End: 2018-03-16

## 2018-03-16 ENCOUNTER — OFFICE VISIT (OUTPATIENT)
Dept: NEUROLOGY | Facility: CLINIC | Age: 53
End: 2018-03-16

## 2018-03-16 VITALS
WEIGHT: 167 LBS | HEIGHT: 65 IN | BODY MASS INDEX: 27.82 KG/M2 | DIASTOLIC BLOOD PRESSURE: 62 MMHG | SYSTOLIC BLOOD PRESSURE: 118 MMHG | RESPIRATION RATE: 18 BRPM | HEART RATE: 70 BPM

## 2018-03-16 DIAGNOSIS — J44.9 OSA AND COPD OVERLAP SYNDROME (HCC): ICD-10-CM

## 2018-03-16 DIAGNOSIS — R40.4 EPISODE OF ALTERED CONSCIOUSNESS: Primary | ICD-10-CM

## 2018-03-16 DIAGNOSIS — G47.33 OSA AND COPD OVERLAP SYNDROME (HCC): ICD-10-CM

## 2018-03-16 PROCEDURE — 99214 OFFICE O/P EST MOD 30 MIN: CPT | Performed by: PSYCHIATRY & NEUROLOGY

## 2018-03-16 NOTE — PROGRESS NOTES
"Subjective   Rand Guzman, 1965, is a female who is being seen today for   Chief Complaint   Patient presents with   • Loss of Consciousness       HISTORY OF PRESENT ILLNESS: Extended follow-up.  Patient started her CPAP with nasal mask.  Patient still having her spells.  Patient was at Dr. Chapman's office where she had a spell of altered consciousness and he is doing further workup with video monitoring.  Patient had her MS LT yesterday after Wednesday night polysomnogram and had a spell during the MS LT where apparently emergency staff was contacted.  Patient could not be aroused.  Patient said she stared but could not speak or move.  She could hear things.  We do not have any reports from the MS LT though I spoke with Dr. Reza who is the sleep specialist in [place].  He apparently is out of the hospital and unable to review the study.  We cannot at this point get medical records on that event but patient says her Accu-Chek was at 95 with that and blood pressure \"pretty high\".  Patient finished rest of the testing.  Patient says she continues having spells where she will be \"out of it\" and still standing but if she gets caffeine she does better.  Patient  is concerned that some of these events may relate to her schizophrenic son that she deals with since 2012.  Blood work done with the spell that she had in our office showed normal prolactin/ normal CPK/ normal lactate, and chem profile showed no major abnormalities    REVIEW OF SYSTEMS:   GENERAL: As above  PULMONARY: As above    CVS:  No acute chest pain or palpitation  GASTROINTESTINAL: No acute GI distress  GENITOURINARY: No acute  distress  GYN: No acute GYN distress  MUSCULOSKELETAL: No acute musculoskeletal symptoms  HEENT:  No acute hearing or vision change  ENDOCRINE:  No acute endocrine symptoms  PSYCHIATRIC: As above  HEMATOLOGY: No anemia  SKIN: No skin changes  Family history reviewed and otherwise noncontributory  Social " history: Patient denies alcohol or drug use or smoking      PHYSICAL EXAMINATION:    GENERAL: No acute distress  CRANIUM: Normocephalic/atraumatic  HEENT: No acute fundic abnormalities.  Pupils equal round reactive to light.       EYES: EOMs intact without nystagmus and fields full to confrontation       EARS:  Tympanic membranes normal hears tuning fork bilaterally       THROAT: No oropharynx abnormalities       NECK:  No bruits/no lymphadenopathy.  Carotid palpation produced no evidence of change in symptoms or signs.  CHEST: No acute cardiopulmonary abnormalities by auscultation  ABDOMEN: Nondistended  EXTREMITIES: Pulses symmetrical  NEURO: Patient alert and follows commands without difficulty  SPEECH:  Normal    CRANIAL NERVES: Motor sensory about the face normal and symmetric    MOTOR STRENGTH:  Motor strength upper and lower extremity is normal  STATION AND GAIT:  Gait normal/Romberg negative  CEREBELLAR:  Finger-nose and heel shin normal  SENSORY:  Pin and vibration upper and lower extremities normal  REFLEXES:  Reflexes present and symmetric upper and lower extremities without Babinski's  OTHER:  No orthostasis with blood pressure 118/62 sitting and 120/68 standing left arm    ASSESSMENT AND PLAN:  Patient with episode of altered consciousness and JAQUAN.  Safety precautions again reviewed.  We are waiting the rest of the testing results.Discussed the patient's BMI with her. BMI is above normal parameters. Follow-up plan includes:  referral to primary care.      Rand was seen today for loss of consciousness.    Diagnoses and all orders for this visit:    Episode of altered consciousness    JAQUAN and COPD overlap syndrome

## 2018-03-16 NOTE — PATIENT INSTRUCTIONS
Patient to not be driving until released.  Patient not to be climbing or using sharp cutting tools or working at heights or working over hot fires or water.  Patient to be taking showers not baths.  Patient not to be swimming or getting in hot tub by self.  Patient to follow-up with PCP about weight and diet control

## 2018-03-16 NOTE — TELEPHONE ENCOUNTER
I have tried to contact [facility] at [phone] with no answer. I have also tried going through the  with no answer. I have not been able to get any records from yesterdays visit.

## 2018-03-28 ENCOUNTER — TELEPHONE (OUTPATIENT)
Dept: NEUROLOGY | Facility: CLINIC | Age: 53
End: 2018-03-28

## 2018-03-28 NOTE — TELEPHONE ENCOUNTER
Rand called and wanted to know if we had her test results.  I told her we were still waiting for some of her results.

## 2018-04-06 ENCOUNTER — HOSPITAL ENCOUNTER (OUTPATIENT)
Dept: NEUROLOGY | Age: 53
Discharge: HOME | End: 2018-04-08
Attending: NEUROLOGICAL SURGERY | Admitting: NEUROLOGICAL SURGERY
Payer: COMMERCIAL

## 2018-04-06 PROBLEM — R56.9 CONVULSIONS (HCC): Status: ACTIVE | Noted: 2018-04-06

## 2018-04-06 PROCEDURE — 6370000000 HC RX 637 (ALT 250 FOR IP): Performed by: PSYCHIATRY & NEUROLOGY

## 2018-04-06 PROCEDURE — 95951 PR EEG MONITORING/VIDEORECORD: CPT | Performed by: PSYCHIATRY & NEUROLOGY

## 2018-04-06 PROCEDURE — 99219 PR INITIAL OBSERVATION CARE/DAY 50 MINUTES: CPT | Performed by: PSYCHIATRY & NEUROLOGY

## 2018-04-06 PROCEDURE — 95951 HC EEG MONITORING VIDEO RECORDING: CPT

## 2018-04-06 RX ORDER — CETIRIZINE HYDROCHLORIDE 10 MG/1
10 TABLET ORAL NIGHTLY
Status: DISCONTINUED | OUTPATIENT
Start: 2018-04-06 | End: 2018-04-08 | Stop reason: HOSPADM

## 2018-04-06 RX ORDER — COVID-19 ANTIGEN TEST
1 KIT MISCELLANEOUS 2 TIMES DAILY PRN
Status: DISCONTINUED | OUTPATIENT
Start: 2018-04-06 | End: 2018-04-06

## 2018-04-06 RX ORDER — SODIUM CHLORIDE 0.9 % (FLUSH) 0.9 %
10 SYRINGE (ML) INJECTION EVERY 12 HOURS SCHEDULED
Status: DISCONTINUED | OUTPATIENT
Start: 2018-04-06 | End: 2018-04-08 | Stop reason: HOSPADM

## 2018-04-06 RX ORDER — BISOPROLOL FUMARATE 5 MG/1
5 TABLET ORAL NIGHTLY
Status: DISCONTINUED | OUTPATIENT
Start: 2018-04-06 | End: 2018-04-08 | Stop reason: HOSPADM

## 2018-04-06 RX ORDER — CETIRIZINE HYDROCHLORIDE 10 MG/1
10 TABLET ORAL DAILY
Status: DISCONTINUED | OUTPATIENT
Start: 2018-04-06 | End: 2018-04-06

## 2018-04-06 RX ORDER — BISOPROLOL FUMARATE 5 MG/1
5 TABLET ORAL DAILY
Status: DISCONTINUED | OUTPATIENT
Start: 2018-04-06 | End: 2018-04-06

## 2018-04-06 RX ORDER — ESTRADIOL 0.5 MG/1
0.5 TABLET ORAL NIGHTLY
Status: DISCONTINUED | OUTPATIENT
Start: 2018-04-06 | End: 2018-04-08 | Stop reason: HOSPADM

## 2018-04-06 RX ORDER — LORAZEPAM 2 MG/ML
1 INJECTION INTRAMUSCULAR PRN
Status: DISCONTINUED | OUTPATIENT
Start: 2018-04-06 | End: 2018-04-08 | Stop reason: HOSPADM

## 2018-04-06 RX ORDER — ESTRADIOL 0.5 MG/1
0.5 TABLET ORAL DAILY
Status: DISCONTINUED | OUTPATIENT
Start: 2018-04-06 | End: 2018-04-06

## 2018-04-06 RX ORDER — SODIUM CHLORIDE 0.9 % (FLUSH) 0.9 %
10 SYRINGE (ML) INJECTION PRN
Status: DISCONTINUED | OUTPATIENT
Start: 2018-04-06 | End: 2018-04-08 | Stop reason: HOSPADM

## 2018-04-06 RX ORDER — ONDANSETRON 2 MG/ML
4 INJECTION INTRAMUSCULAR; INTRAVENOUS EVERY 6 HOURS PRN
Status: DISCONTINUED | OUTPATIENT
Start: 2018-04-06 | End: 2018-04-08 | Stop reason: HOSPADM

## 2018-04-06 RX ORDER — TOPIRAMATE 25 MG/1
25 TABLET ORAL 2 TIMES DAILY
Status: DISCONTINUED | OUTPATIENT
Start: 2018-04-06 | End: 2018-04-08 | Stop reason: HOSPADM

## 2018-04-06 RX ORDER — CLONAZEPAM 0.5 MG/1
0.5 TABLET ORAL 2 TIMES DAILY PRN
Status: DISCONTINUED | OUTPATIENT
Start: 2018-04-06 | End: 2018-04-08 | Stop reason: HOSPADM

## 2018-04-06 RX ORDER — NAPROXEN 250 MG/1
250 TABLET ORAL 2 TIMES DAILY PRN
Status: DISCONTINUED | OUTPATIENT
Start: 2018-04-06 | End: 2018-04-08 | Stop reason: HOSPADM

## 2018-04-06 RX ADMIN — ESTRADIOL 0.5 MG: 0.5 TABLET ORAL at 20:23

## 2018-04-06 RX ADMIN — CETIRIZINE HYDROCHLORIDE 10 MG: 10 TABLET, FILM COATED ORAL at 20:24

## 2018-04-06 RX ADMIN — TOPIRAMATE 25 MG: 25 TABLET, FILM COATED ORAL at 20:23

## 2018-04-06 RX ADMIN — BISOPROLOL FUMARATE 5 MG: 5 TABLET ORAL at 20:24

## 2018-04-07 PROCEDURE — 6370000000 HC RX 637 (ALT 250 FOR IP): Performed by: PSYCHIATRY & NEUROLOGY

## 2018-04-07 PROCEDURE — 95951 PR EEG MONITORING/VIDEORECORD: CPT | Performed by: PSYCHIATRY & NEUROLOGY

## 2018-04-07 PROCEDURE — 99225 PR SBSQ OBSERVATION CARE/DAY 25 MINUTES: CPT | Performed by: PSYCHIATRY & NEUROLOGY

## 2018-04-07 PROCEDURE — 2580000003 HC RX 258: Performed by: PSYCHIATRY & NEUROLOGY

## 2018-04-07 PROCEDURE — 95951 HC EEG MONITORING VIDEO RECORDING: CPT

## 2018-04-07 PROCEDURE — 6360000002 HC RX W HCPCS: Performed by: PSYCHIATRY & NEUROLOGY

## 2018-04-07 RX ADMIN — BISOPROLOL FUMARATE 5 MG: 5 TABLET ORAL at 20:02

## 2018-04-07 RX ADMIN — CETIRIZINE HYDROCHLORIDE 10 MG: 10 TABLET, FILM COATED ORAL at 20:01

## 2018-04-07 RX ADMIN — TOPIRAMATE 25 MG: 25 TABLET, FILM COATED ORAL at 08:01

## 2018-04-07 RX ADMIN — TOPIRAMATE 25 MG: 25 TABLET, FILM COATED ORAL at 20:01

## 2018-04-07 RX ADMIN — ESTRADIOL 0.5 MG: 0.5 TABLET ORAL at 20:01

## 2018-04-07 RX ADMIN — NAPROXEN 250 MG: 250 TABLET ORAL at 08:05

## 2018-04-07 RX ADMIN — Medication 10 ML: at 08:06

## 2018-04-07 ASSESSMENT — PAIN SCALES - GENERAL: PAINLEVEL_OUTOF10: 7

## 2018-04-08 VITALS
OXYGEN SATURATION: 94 % | HEIGHT: 63 IN | HEART RATE: 74 BPM | TEMPERATURE: 97.1 F | DIASTOLIC BLOOD PRESSURE: 80 MMHG | WEIGHT: 199 LBS | SYSTOLIC BLOOD PRESSURE: 122 MMHG | BODY MASS INDEX: 35.26 KG/M2 | RESPIRATION RATE: 16 BRPM

## 2018-04-08 PROCEDURE — 6370000000 HC RX 637 (ALT 250 FOR IP): Performed by: PSYCHIATRY & NEUROLOGY

## 2018-04-08 PROCEDURE — 99217 PR OBSERVATION CARE DISCHARGE MANAGEMENT: CPT | Performed by: PSYCHIATRY & NEUROLOGY

## 2018-04-08 PROCEDURE — 95951 PR EEG MONITORING/VIDEORECORD: CPT | Performed by: PSYCHIATRY & NEUROLOGY

## 2018-04-08 RX ADMIN — TOPIRAMATE 25 MG: 25 TABLET, FILM COATED ORAL at 09:25

## 2018-04-13 ENCOUNTER — TELEPHONE (OUTPATIENT)
Dept: NEUROLOGY | Age: 53
End: 2018-04-13

## 2018-04-17 ENCOUNTER — TELEPHONE (OUTPATIENT)
Dept: NEUROSURGERY | Age: 53
End: 2018-04-17

## 2018-04-27 ENCOUNTER — OFFICE VISIT (OUTPATIENT)
Dept: NEUROLOGY | Facility: CLINIC | Age: 53
End: 2018-04-27

## 2018-04-27 VITALS
HEIGHT: 65 IN | RESPIRATION RATE: 18 BRPM | SYSTOLIC BLOOD PRESSURE: 108 MMHG | BODY MASS INDEX: 27.32 KG/M2 | DIASTOLIC BLOOD PRESSURE: 62 MMHG | HEART RATE: 72 BPM | WEIGHT: 164 LBS

## 2018-04-27 DIAGNOSIS — G47.33 OSA (OBSTRUCTIVE SLEEP APNEA): Primary | ICD-10-CM

## 2018-04-27 DIAGNOSIS — G47.11 IDIOPATHIC HYPERSOMNIA: ICD-10-CM

## 2018-04-27 PROCEDURE — 99214 OFFICE O/P EST MOD 30 MIN: CPT | Performed by: PSYCHIATRY & NEUROLOGY

## 2018-04-27 RX ORDER — MODAFINIL 200 MG/1
TABLET ORAL
Qty: 30 TABLET | Refills: 1 | Status: SHIPPED | OUTPATIENT
Start: 2018-04-27

## 2018-04-27 NOTE — PROGRESS NOTES
Subjective   Rand Guzman, 1965, is a female who is being seen today for   Chief Complaint   Patient presents with   • Altered Mental Status       HISTORY OF PRESENT ILLNESS: Extended follow-up.  Patient had her evaluation per Dr. villalobos in the EEG video suggested that these were psychiatric induced events.  Patient has apparently reviewed or is going to review her headache treatment of Topamax with her PCP.  Patient's MS LT after her overnight polysomnogram with Pap device suggested idiopathic hypersomnia.  There was no urine drug screen done though it was requested.  Patient had no REM onset naps.  Patient had mean sleep latency of 10 minutes.  Patient might be a candidate for her Provigil.  Patient side effects of the medications were reviewed in detail.  Patient would be taking 100 mg at 7  mg at noon.  There has been a question of cataplexy but this is been rather vague in this patient's history.  Patient says that previously she was thought to be ADHD in the past and took Ritalin and seemed to tolerate it.  Patient is using her Pap device nightly.  Her polysomnogram showed an AHI of 0.8 with good sleep efficiency and enough sleep to verify the MS LT results.  REM latency was over 100 minutes.    REVIEW OF SYSTEMS:   GENERAL: As above  PULMONARY: As above  CVS:  No acute chest pain or palpitation  GASTROINTESTINAL: No acute GI distress  GENITOURINARY: No acute  distress  GYN: No acute GYN distress  MUSCULOSKELETAL: No acute musculoskeletal symptoms  HEENT:  No acute vision or hearing change  ENDOCRINE:  No acute endocrine symptoms  PSYCHIATRIC: No acute psychiatric symptoms  HEMATOLOGY: No anemia  SKIN: No skin changes   Family history reviewed and otherwise noncontributory social history: Patient denies alcohol or drug use or smoking   PHYSICAL EXAM:    GENERAL: No acute distress.  No orthostasis  CRAN:Normocephalic/atraumatic  HEENT: EOMs intact without nystagmus and fields full to  confrontation       EYES: no acute fundic abnormalities and pupils equal round reactive to light.       EARS:  Tympanic membranes normal hears tuning fork bilaterally       THROAT: No oropharynx abnormalities       NECK:  No bruits/no lymphadenopathy  CHEST:No acute cardiopulmonary abnormalities by auscultation  ABDOMEN: nondistended  EXTREMITIES:Pulses symmetrical   NEURO: patient alert and follows commands without difficulty  SPEECH:  normal    CRANIAL NERVES:  Motor sensory about the face normal and symmetric   MOTOR STRENGTH: Motor strength upper and lower extremities normal  STATION AND GAIT:  Gait normal/Romberg negative  CEREBELLAR:   Finger-nose and heel shin normal  SENSORY:  Patient noticing some slight numbness around her heels and has some slight decrease in pin and vibration distal to proximal in lower extremities to mid foot bilaterally otherwise normal pin and vibration throughout  REFLEXES:  Reflexes slightly decreased throughout upper and lower extremities without Babinski's or clonus      ASSESSMENT AND PLAN:  Patient with JAQUAN and idiopathic hypersomnia we are starting Provigil as above. Patient's Body mass index is 27.72 kg/m². BMI is above normal parameters. Follow-up plan includes:  referral to primary care.  Health maintenance is reviewed by nursing      Rand was seen today for altered mental status.    Diagnoses and all orders for this visit:    JAQUAN (obstructive sleep apnea)    Idiopathic hypersomnia    Other orders  -     modafinil (PROVIGIL) 200 MG tablet; Take 1/2 tablet by mouth at 7am and 1/2 tablet at 12 noon

## 2018-04-27 NOTE — PATIENT INSTRUCTIONS
Patient to continue no driving and safety precautions as previously instructed.  Patient to get with PCP about weight and diet control

## 2018-05-07 DIAGNOSIS — G47.33 OSA (OBSTRUCTIVE SLEEP APNEA): ICD-10-CM

## 2018-05-24 ENCOUNTER — TELEPHONE (OUTPATIENT)
Dept: NEUROLOGY | Facility: CLINIC | Age: 53
End: 2018-05-24

## 2018-05-24 NOTE — TELEPHONE ENCOUNTER
Left message reminding Mrs Guzman of her appointment tomorrow at 245 pm with Dr Nails. Also advised if she had any questions or needed to reschedule to please call the office at [phone].

## 2018-05-25 ENCOUNTER — OFFICE VISIT (OUTPATIENT)
Dept: NEUROLOGY | Facility: CLINIC | Age: 53
End: 2018-05-25

## 2018-05-25 VITALS
RESPIRATION RATE: 18 BRPM | HEIGHT: 65 IN | DIASTOLIC BLOOD PRESSURE: 80 MMHG | WEIGHT: 160 LBS | BODY MASS INDEX: 26.66 KG/M2 | SYSTOLIC BLOOD PRESSURE: 108 MMHG | HEART RATE: 76 BPM

## 2018-05-25 DIAGNOSIS — G47.33 OSA (OBSTRUCTIVE SLEEP APNEA): ICD-10-CM

## 2018-05-25 DIAGNOSIS — G47.11 IDIOPATHIC HYPERSOMNIA: Primary | ICD-10-CM

## 2018-05-25 DIAGNOSIS — R40.4 EPISODE OF ALTERED CONSCIOUSNESS: ICD-10-CM

## 2018-05-25 PROCEDURE — 99214 OFFICE O/P EST MOD 30 MIN: CPT | Performed by: PSYCHIATRY & NEUROLOGY

## 2018-05-25 RX ORDER — CLONAZEPAM 0.25 MG/1
0.25 TABLET, ORALLY DISINTEGRATING ORAL 2 TIMES DAILY PRN
COMMUNITY

## 2018-05-25 RX ORDER — ROSUVASTATIN CALCIUM 10 MG/1
10 TABLET, COATED ORAL DAILY
COMMUNITY

## 2018-05-25 NOTE — PATIENT INSTRUCTIONS
No driving until released.  No climbing and no use of sharp cutting tools/no work over hot fires or water/ no swimming by self and no use of hot tub by self.  No work at heights.

## 2018-06-20 ENCOUNTER — OFFICE VISIT (OUTPATIENT)
Dept: NEUROLOGY | Facility: CLINIC | Age: 53
End: 2018-06-20

## 2018-06-20 VITALS
BODY MASS INDEX: 26.82 KG/M2 | HEART RATE: 76 BPM | WEIGHT: 161 LBS | DIASTOLIC BLOOD PRESSURE: 70 MMHG | HEIGHT: 65 IN | SYSTOLIC BLOOD PRESSURE: 120 MMHG | RESPIRATION RATE: 18 BRPM

## 2018-06-20 DIAGNOSIS — R40.4 EPISODE OF ALTERED CONSCIOUSNESS: ICD-10-CM

## 2018-06-20 DIAGNOSIS — G47.11 IDIOPATHIC HYPERSOMNIA: Primary | ICD-10-CM

## 2018-06-20 DIAGNOSIS — G47.33 OSA (OBSTRUCTIVE SLEEP APNEA): ICD-10-CM

## 2018-06-20 PROCEDURE — 99213 OFFICE O/P EST LOW 20 MIN: CPT | Performed by: PSYCHIATRY & NEUROLOGY

## 2018-06-20 RX ORDER — MODAFINIL 200 MG/1
TABLET ORAL
Qty: 30 TABLET | Refills: 5 | Status: CANCELLED | OUTPATIENT
Start: 2018-06-20

## 2018-06-20 NOTE — PROGRESS NOTES
Subjective   Rand Guzman, 1965, is a female who is being seen today for   Chief Complaint   Patient presents with   • Altered Mental Status       HISTORY OF PRESENT ILLNESS: Patient seen for history of idiopathic hypersomnia and has increased her Provigil as of one week to 200 mg every 7 AM and 200 mg every noon without side effects.  That is helping her daytime sleepiness.  She still has episodes of syncope when she exerts herself or in crowds.  Patient is scheduled to see a psychologist when she comes back from [place].  Patient has already reviewed potential risks benefits of her trip and flight.  We again went over her precautions and no driving.  We went over her diagnoses in detail.    REVIEW OF SYSTEMS:   GENERAL: Blood pressure 114 or 62 left arm sitting and 120/70 left arm standing with pulse 76.  PULMONARY: Patient has JAQUAN and using her Pap Intermittently.  She is encouraged to continue using it regularly  CVS: No chest pain or palpitation  GASTROINTESTINAL: No acute GI distress  GENITOURINARY: No acute  distress  GYN: No acute GYN distress  MUSCULOSKELETAL: No acute musculoskeletal changes  HEENT:  No acute vision or hearing change  ENDOCRINE:  No acute endocrine symptoms  PSYCHIATRIC: No acute psychiatric symptoms  HEMATOLOGY: No anemia  SKIN: No acute skin changes  Family history reviewed and otherwise noncontributory  Social history: Patient denies alcohol or drug use or smoking      PHYSICAL EXAMINATION:    GENERAL: No acute distress  CRANIUM: Normocephalic/atraumatic  HEENT: No acute fundic abnormalities.  Pupils equal round reactive to light.       EYES: No EOM abnormalities and fields full to confrontation       EARS:  Tympanic membranes normal/ hears tuning fork bilaterally       THROAT: No oropharynx abnormalities       NECK:  No bruits/no lymphadenopathy  CHEST: No acute cardiopulmonary abnormalities by auscultation  ABDOMEN: Nondistended  EXTREMITIES: Pulses symmetrical  NEURO:  Patient alert and follows commands without difficulty  SPEECH:  Normal    CRANIAL NERVES:  Motor sensory about the face normal    MOTOR STRENGTH:  Motor strength upper and lower extremities normal  STATION AND GAIT:  Gait normal/Romberg negative  CEREBELLAR:  Finger-nose and heel shin normal  SENSORY:  Pin and vibration upper and lower extremities normal  REFLEXES:  Reflexes present and symmetric upper and lower extremities without Babinski's      ASSESSMENT AND PLAN:  Patient with idiopathic hypersomnia and episode of altered consciousness and sleep apnea.  Patient to continue the Provigil as prescribed. Patient's Body mass index is 27.21 kg/m². BMI is above normal parameters. Recommendations include: no follow-up required.      Rand was seen today for altered mental status.    Diagnoses and all orders for this visit:    Idiopathic hypersomnia    JAQUAN (obstructive sleep apnea)    Episode of altered consciousness    Other orders  -     Cancel: modafinil (PROVIGIL) 200 MG tablet; Take 1 tab po at 7 AM and 1 tab po at 12 noon

## 2018-06-21 ENCOUNTER — TELEPHONE (OUTPATIENT)
Dept: NEUROLOGY | Facility: CLINIC | Age: 53
End: 2018-06-21

## 2018-06-21 NOTE — TELEPHONE ENCOUNTER
Dr. Nails wanted to know if she was wearing her PAP device more that 4 hours nightly.  She said she is somewhat wearing it and that she can't tell any difference when she doesn't wear it.  She is flying to [place] tomorrow and said she doesn't want to take the PAP device with her, but she will if Dr. Nails thinks she should.  I told her I talked to Dr. Nails and he thought she should take it with her

## 2018-07-16 ENCOUNTER — TELEPHONE (OUTPATIENT)
Dept: NEUROLOGY | Facility: CLINIC | Age: 53
End: 2018-07-16

## 2018-07-16 NOTE — TELEPHONE ENCOUNTER
----- Message from Erwin Nails MD sent at 6/21/2018  3:10 PM CDT -----  Please encourage patient to use Pap device regularly

## 2018-07-16 NOTE — TELEPHONE ENCOUNTER
Rand said she still has episodes during the day when she wears the CPAP.  I told her it keeps helps with her sleep apnea.  She said ok, but didn't say she would wear in nightly.

## 2018-07-23 ENCOUNTER — OFFICE VISIT (OUTPATIENT)
Dept: NEUROSURGERY | Age: 53
End: 2018-07-23
Payer: COMMERCIAL

## 2018-07-23 VITALS
WEIGHT: 159.6 LBS | OXYGEN SATURATION: 98 % | DIASTOLIC BLOOD PRESSURE: 68 MMHG | HEART RATE: 75 BPM | HEIGHT: 64 IN | SYSTOLIC BLOOD PRESSURE: 112 MMHG | BODY MASS INDEX: 27.25 KG/M2

## 2018-07-23 DIAGNOSIS — R51.9 HEADACHE, UNSPECIFIED HEADACHE TYPE: ICD-10-CM

## 2018-07-23 DIAGNOSIS — R56.9 CONVULSIONS, UNSPECIFIED CONVULSION TYPE (HCC): Primary | ICD-10-CM

## 2018-07-23 DIAGNOSIS — F32.A DEPRESSION, UNSPECIFIED DEPRESSION TYPE: ICD-10-CM

## 2018-07-23 PROCEDURE — 99214 OFFICE O/P EST MOD 30 MIN: CPT | Performed by: PSYCHIATRY & NEUROLOGY

## 2018-07-23 RX ORDER — MODAFINIL 200 MG/1
1 TABLET ORAL 2 TIMES DAILY
COMMUNITY
Start: 2018-04-27 | End: 2018-10-15 | Stop reason: ALTCHOICE

## 2018-07-23 RX ORDER — ROSUVASTATIN CALCIUM 10 MG/1
10 TABLET, COATED ORAL DAILY
COMMUNITY

## 2018-07-23 NOTE — PROGRESS NOTES
[id number] [facility] Neurology Office Note      Patient:   Uriah Solis  MR#:    [id number]  Account Number:                         YOB: 1965  Date of Evaluation:  7/23/2018  Time of Note:                          10:05 AM  Primary/Referring Physician:  Magui Escobar MD  Consulting Physician:  Mulu Lorenzo DO    FOLLOW UP    Chief Complaint   Patient presents with    Other     Needing advise     Seizures     Also had a eppisode last night    Results     Video EEG results       HISTORY OF PRESENT ILLNESS    Uriah Solis is a 47y.o. year old female here for convulsions. The patient is still noting frequent events. Video EEG confirmed non-epileptic events. Seen by Dr. Kathleen Ndiaye. She is now on Klonopin, and has improved from an anxiety standpoint. Extensive workup including MRI, MRA, US largely negative. Event frequency, duration, character, and severity is largely unchanged since her discharge, no improvment. Past Medical History:   Diagnosis Date    Anxiety     CAD (coronary artery disease)     Depression     Headache     Hyperlipidemia     Hypertension     Neuromuscular disorder (HCC)     Seizures (HCC)        Past Surgical History:   Procedure Laterality Date    BREAST LUMPECTOMY      COLONOSCOPY      ENDOSCOPY, COLON, DIAGNOSTIC      HYSTERECTOMY, TOTAL ABDOMINAL      KNEE SURGERY      SKIN BIOPSY         Family History   Problem Relation Age of Onset    Cancer Father     Colon Cancer Brother     Diabetes Maternal Grandfather     Diabetes Paternal Grandmother        Social History     Social History    Marital status:      Spouse name: N/A    Number of children: N/A    Years of education: N/A     Occupational History    Not on file.      Social History Main Topics    Smoking status: Never Smoker    Smokeless tobacco: Never Used    Alcohol use No    Drug use: No    Sexual activity: Yes     Other Topics Concern    Not on file     Social History Narrative    No finger rub normal bilaterally  [x]Shoulder shrug and SCM testing normal bilaterally  COMMENTS:   Motor   [x]5/5 strength x 4 extremities  [x]Normal bulk and tone  [x]No tremor present  [x]No rigidity or bradykinesia noted  COMMENTS:   Sensory  [x]Sensation intact to light touch, pin prick, vibration, and proprioception BLE  [x]Sensation intact to light touch, pin prick, vibration, and proprioception BUE  COMMENTS:   Coordination [x]FTN normal bilaterally   [x]HTS normal bilaterally  [x]SALVADOR normal bilaterally. COMMENTS:   Reflexes  [x]Symmetric and non-pathological  [x]Toes down going bilaterally  [x]No clonus present  COMMENTS:   Gait                  [x]Normal steady gait    []Ataxic    []Spastic     []Magnetic     []Shuffling  COMMENTS:       LABS RECORD AND IMAGING REVIEW (As below and per HPI)    Records reviewed, as per HPI. Hospital records reviewed. EEG reviewed. Labs reviewed. [facility] records reviewed. ASSESSMENT:    Mindy Younger is a 47y.o. year old female here for convulsions. Video EEG confirmed non-epileptic events. Currently on Klonopin for anxiety. Other workup including MRI, MRA, CD, labs largely negative. Has underlying hypersomnia, sleep workup completed at [facility] [address]:  1. Hold off on AEDs given Video EEG results. 2.  Continue Klonopin, psych referral.  3.  Event precautions discussed. No driving, heights, swimming, tub baths, open flames, or heavy machinery given continued events. 4.  Follow up with Dr. Saintclair Edinger for sleep disorder and headache, currently on Provigil. On Topamax for migraine as well.       Salazar Borges DO  Board Certified Neurology  used

## 2018-07-24 ENCOUNTER — TELEPHONE (OUTPATIENT)
Dept: NEUROLOGY | Age: 53
End: 2018-07-24

## 2018-08-21 ENCOUNTER — OFFICE VISIT (OUTPATIENT)
Dept: PSYCHIATRY | Age: 53
End: 2018-08-21
Payer: COMMERCIAL

## 2018-08-21 VITALS
HEIGHT: 65 IN | WEIGHT: 156 LBS | OXYGEN SATURATION: 100 % | HEART RATE: 65 BPM | BODY MASS INDEX: 25.99 KG/M2 | DIASTOLIC BLOOD PRESSURE: 75 MMHG | SYSTOLIC BLOOD PRESSURE: 149 MMHG

## 2018-08-21 DIAGNOSIS — F41.1 GAD (GENERALIZED ANXIETY DISORDER): Primary | ICD-10-CM

## 2018-08-21 PROCEDURE — 90791 PSYCH DIAGNOSTIC EVALUATION: CPT | Performed by: COUNSELOR
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
## 2018-08-27 ENCOUNTER — TELEPHONE (OUTPATIENT)
Dept: NEUROLOGY | Age: 53
End: 2018-08-27

## 2018-08-27 NOTE — TELEPHONE ENCOUNTER
Patient called and stated that she was confuse. Her referral to psychiatry said one doctor but then she seen someone else. I let the patient know that we put on the referral which ever provider had the soonest appointment. I explained to the patient that we did that because they are busy and wanted to get her in as soon as they could. She said that made sense, but since she seen the counselor they want her to the nurse practitioner before the doctor. She is cancelling her appointment next month with the nurse practitioner and will see the doctor in October and then follow up with Dr. Idalia Ng in November.

## 2018-09-04 ENCOUNTER — TELEPHONE (OUTPATIENT)
Dept: NEUROLOGY | Age: 53
End: 2018-09-04

## 2018-10-15 ENCOUNTER — OFFICE VISIT (OUTPATIENT)
Dept: PSYCHIATRY | Age: 53
End: 2018-10-15
Payer: COMMERCIAL

## 2018-10-15 VITALS
SYSTOLIC BLOOD PRESSURE: 111 MMHG | BODY MASS INDEX: 24.66 KG/M2 | WEIGHT: 148 LBS | DIASTOLIC BLOOD PRESSURE: 76 MMHG | HEART RATE: 62 BPM | OXYGEN SATURATION: 100 % | HEIGHT: 65 IN

## 2018-10-15 DIAGNOSIS — F31.9 BIPOLAR 1 DISORDER (HCC): Primary | ICD-10-CM

## 2018-10-15 PROCEDURE — 90792 PSYCH DIAG EVAL W/MED SRVCS: CPT | Performed by: NURSE PRACTITIONER

## 2018-10-15 RX ORDER — ARMODAFINIL 200 MG/1
TABLET ORAL
Refills: 5 | COMMUNITY
Start: 2018-10-12

## 2018-10-15 RX ORDER — OLANZAPINE 10 MG/1
5 TABLET ORAL 2 TIMES DAILY
Qty: 30 TABLET | Refills: 1 | Status: SHIPPED | OUTPATIENT
Start: 2018-10-15

## 2018-10-15 NOTE — PATIENT INSTRUCTIONS
tongue, or throat. Seek medical treatment if you have symptoms of a serious drug reaction that can affect many parts of your body. Symptoms may include: skin rash, fever, swollen glands, flu-like symptoms, unusual bruising, or jaundice (yellowing of your skin or eyes). Call your doctor at once if you have:  · uncontrolled muscle movements in your face (chewing, lip smacking, frowning, tongue movement, blinking or eye movement);  · trouble speaking or swallowing;  · swelling in your hands or feet;  · confusion, unusual thoughts or behavior, hallucinations, or thoughts about hurting yourself;  · sudden weakness or ill feeling, fever, chills, sore throat, swollen gums, painful mouth sores, pain when swallowing, skin sores, cold or flu symptoms, cough;  · signs of dehydration --feeling very thirsty or hot, being unable to urinate, heavy sweating, or hot and dry skin;  · liver problems --upper stomach pain, itching, loss of appetite, dark urine, jessica-colored stools, jaundice (yellowing of the skin or eyes);  · high blood sugar --increased thirst, increased urination, hunger, dry mouth, fruity breath odor, drowsiness, dry skin, blurred vision, weight loss; or  · severe nervous system reaction --very stiff (rigid) muscles, high fever, sweating, confusion, fast or uneven heartbeats, tremors, feeling like you might pass out. Common side effects may include:  · weight gain (more likely in teenagers), increased appetite;  · headache, dizziness, drowsiness, feeling tired or restless;  · problems with speech or memory;  · tremors or shaking, numbness or tingly feeling;  · changes in personality;  · dry mouth, or increased salivation;  · stomach pain, constipation; or  · pain in your arms or legs. This is not a complete list of side effects and others may occur. Call your doctor for medical advice about side effects. You may report side effects to FDA at 9-673-FDA-0728. What other drugs will affect olanzapine?   Taking with the aid of information Yue provides. The information contained herein is not intended to cover all possible uses, directions, precautions, warnings, drug interactions, allergic reactions, or adverse effects. If you have questions about the drugs you are taking, check with your doctor, nurse or pharmacist.  Copyright 9863-1263 [address]: 15.03. Revision date: 9/27/2017. Care instructions adapted under license by [facility] ([facility]). If you have questions about a medical condition or this instruction, always ask your healthcare professional. [place] [address] any warranty or liability for your use of this information.

## 2018-10-29 ENCOUNTER — TELEPHONE (OUTPATIENT)
Dept: PSYCHIATRY | Age: 53
End: 2018-10-29

## 2021-06-03 NOTE — TELEPHONE ENCOUNTER
Patient calls asking if her test results have been sent from [facility] [place].  I told her they have not.  She will contact the hospital and have them faxed.   
Never smoker

## 2023-07-11 NOTE — PROGRESS NOTES
Subjective   Rand Guzman, 1965, is a female who is being seen today for   Chief Complaint   Patient presents with   • Sleeping Problem     Patient states that she is still having problems falling asleep during the day time.       HISTORY OF PRESENT ILLNESS:Extended follow-up.  Patient is tolerating the Provigil at 100 mg at 7  and noon.  However she still having some daytime sleepiness and were going to increase the dosage to 200 mg at 7 AM and 100 mg at noon for 2 weeks and then if tolerated increase to 200 mg at 7 AM and 200 mg at noon.  Patient still has some of her episodes of passing out and fatigue.  She has increased her clonazepam.  REVIEW OF SYSTEMS:   GENERAL: Blood pressure 100/80 sitting and 108/80 standing with pulse 76.  PULMONARY: Patient says she is using her Pap device more than 4 hours every night  CVS:  No acute chest pain or palpitation  GASTROINTESTINAL: No acute GI distress  GENITOURINARY: No acute  distress  GYN: No acute GYN distress  MUSCULOSKELETAL: No acute musculoskeletal symptoms  HEENT:  No acute vision or hearing change  ENDOCRINE:  No acute endocrine symptoms  PSYCHIATRIC: As above  HEMATOLOGY: No anemia  SKIN: No skin changes  Family history reviewed and otherwise noncontributory.  Social history: Patient denies alcohol or drug use or smoking      PHYSICAL EXAMINATION:    GENERAL: No acute distress  CRANIUM: Normocephalic/atraumatic/atraumatic  HEENT: No acute fundic abnormalities       EYES: EOMs intact without nystagmus and fields full to confrontation.  Pupils equal round reactive to light.       EARS:  Tympanic membranes normal hears tuning fork bilaterally       THROAT: No oropharynx abnormalities       NECK:  No bruits/no lymphadenopathy  CHEST: No acute cardiopulmonary abnormalities by auscultation  ABDOMEN: Nondistended  EXTREMITIES: Pulses symmetrical  NEURO: Patient alert and follows commands without difficulty  SPEECH:  Normal    CRANIAL NERVES:  Motor  sensory about the face normal and symmetric    MOTOR STRENGTH:  Motor strength upper and lower extremities normal  STATION AND GAIT:  Gait normal/Romberg negative  CEREBELLAR:  Finger-nose and heel shin normal  SENSORY:  Pin and vibration upper and lower extremities normal except for slight decrease in pin and vibration distal to proximal in lower extremities to mid foot bilaterally  REFLEXES:  Reflexes are present and symmetric upper and lower extremities but slightly decreased throughout.    ASSESSMENT AND PLAN:  Idiopathic hypersomnia we are increasing the Provigil as above watching for side effects.  Patient also has JAQUAN and episodes of altered consciousness.  Patient not to be driving and safety precautions reviewed.Patient's Body mass index is 27.04 kg/m². BMI is above normal parameters. Recommendations include: no follow-up required.  I spent 25 minutes with this patient with 15 minutes counseling      Rand was seen today for sleeping problem.    Diagnoses and all orders for this visit:    Idiopathic hypersomnia    JAQUAN (obstructive sleep apnea)    Episode of altered consciousness       ambulate